# Patient Record
Sex: FEMALE | Race: WHITE
[De-identification: names, ages, dates, MRNs, and addresses within clinical notes are randomized per-mention and may not be internally consistent; named-entity substitution may affect disease eponyms.]

---

## 2017-08-03 ENCOUNTER — HOSPITAL ENCOUNTER (EMERGENCY)
Dept: HOSPITAL 80 - CED | Age: 67
Discharge: HOME | End: 2017-08-03
Payer: COMMERCIAL

## 2017-08-03 VITALS
DIASTOLIC BLOOD PRESSURE: 105 MMHG | OXYGEN SATURATION: 98 % | HEART RATE: 69 BPM | RESPIRATION RATE: 18 BRPM | SYSTOLIC BLOOD PRESSURE: 188 MMHG

## 2017-08-03 VITALS — TEMPERATURE: 98.2 F

## 2017-08-03 DIAGNOSIS — T63.481A: Primary | ICD-10-CM

## 2017-08-03 NOTE — EDPHY
H & P


Time Seen by Provider: 08/03/17 15:43


HPI/ROS: 





CHIEF COMPLAINT:  Insect sting





HISTORY OF PRESENT ILLNESS:  Patient is a 66-year-old female who presents to 

the emergency department with right medial thigh discomfort after being stung 

by an insect.  She did not see whether it was a bee or a wasp.  She was wearing 

shorts when stung.  She immediately felt a sting and pain.  She now has 

moderate redness surrounding the area.  She was concerned that she had an 

infection.  No fevers or chills. Her pain is mild.





REVIEW OF SYSTEMS:  


My complete review of systems is negative except as mentioned in the HPI.


Past Medical/Surgical History: 





Noncontributory


Smoking Status: Never smoked


Physical Exam: 





General Appearance:  Alert and no distress.


Head:  Pupils equal.  Normal.


Respiratory:  No respiratory distress.


Cardiac:  regular rate and rhythm.


Extremities:  right lower extremity:  The patient has 1 inch area significant 

induration surrounding the puncture site.  There is surrounding mild erythema.  

This seems to be more toxic reaction than cellulitic.  There is no streaking up 

the leg.


Skin:  No rashes or lesions.


Neuro:  Alert.  Normal mood and affect.


Constitutional: 





 Initial Vital Signs











Temperature (C)  36.8 C   08/03/17 15:41


 


Heart Rate  63   08/03/17 15:41


 


Respiratory Rate  16   08/03/17 15:41


 


Blood Pressure  196/110 H  08/03/17 15:41


 


O2 Sat (%)  95   08/03/17 15:41








 











O2 Delivery Mode               Room Air














Allergies/Adverse Reactions: 


 





dextromethorphan [From Mucinex DM] Allergy (Verified 08/03/17 15:41)


 


guaifenesin [From Mucinex DM] Allergy (Verified 08/03/17 15:41)


 








Home Medications: 














 Medication  Instructions  Recorded


 


Glucosam/Chondr/Collagn/Hyalur 1 each PO 09/09/13





[Glucosamine & Chondroitin Cap]  


 


Multivitamins [Multivitamin (OTC)]  09/09/13


 


Cephalexin [Keflex (*)] 500 mg PO QID 7 Days 08/03/17


 


Lovastatin  08/03/17














Medical Decision Making


ED Course/Re-evaluation: 





Patient presents with a sore leg after being stung by an insect.  This appears 

to be secondary to toxin.  However, I cannot fully rule out cellulitis based on 

exam.  Patient does not appear septic or toxic.  She will be started on Keflex 

to cover for possible infection.  She is given warnings.  I explained all this 

to the patient and answered her questions.


Differential Diagnosis: 





My differential includes but is not limited to insect sting, cellulitis, abscess

, foreign body





Departure





- Departure


Disposition: Home, Routine, Self-Care


Clinical Impression: 


 Rash





Insect sting


Qualifiers:


 Encounter type: initial encounter Injury intent: undetermined intent Qualified 

Code(s): T63.484A - Toxic effect of venom of other arthropod, undetermined, 

initial encounter





Condition: Good


Instructions:  Insect Bite or Sting (ED)


Additional Instructions: 


Return with increasing redness, pain, fever, chills or any other concerns.


Referrals: 


KEREN VILLAREAL [Primary Care Provider] - 3-4 days, if not improved


Prescriptions: 


Cephalexin [Keflex (*)] 500 mg PO QID 7 Days

## 2018-05-09 ENCOUNTER — HOSPITAL ENCOUNTER (OUTPATIENT)
Dept: HOSPITAL 80 - CIMAGING | Age: 68
End: 2018-05-09
Attending: FAMILY MEDICINE
Payer: COMMERCIAL

## 2018-05-09 DIAGNOSIS — Z12.31: Primary | ICD-10-CM
